# Patient Record
Sex: FEMALE | Race: WHITE | NOT HISPANIC OR LATINO | URBAN - METROPOLITAN AREA
[De-identification: names, ages, dates, MRNs, and addresses within clinical notes are randomized per-mention and may not be internally consistent; named-entity substitution may affect disease eponyms.]

---

## 2020-05-28 ENCOUNTER — APPOINTMENT (RX ONLY)
Dept: URBAN - METROPOLITAN AREA CLINIC 133 | Facility: CLINIC | Age: 45
Setting detail: DERMATOLOGY
End: 2020-05-28

## 2020-05-28 DIAGNOSIS — L71.8 OTHER ROSACEA: ICD-10-CM

## 2020-05-28 DIAGNOSIS — L20.89 OTHER ATOPIC DERMATITIS: ICD-10-CM

## 2020-05-28 DIAGNOSIS — L81.1 CHLOASMA: ICD-10-CM

## 2020-05-28 PROCEDURE — ? PRESCRIPTION

## 2020-05-28 PROCEDURE — ? COUNSELING

## 2020-05-28 PROCEDURE — ? ADDITIONAL NOTES

## 2020-05-28 PROCEDURE — 99202 OFFICE O/P NEW SF 15 MIN: CPT

## 2020-05-28 PROCEDURE — ? MEDICAL CONSULTATION: MELASMA

## 2020-05-28 RX ORDER — CRISABOROLE 20 MG/G
OINTMENT TOPICAL
Qty: 1 | Refills: 3 | Status: ERX | COMMUNITY
Start: 2020-05-28

## 2020-05-28 RX ORDER — METRONIDAZOLE 7.5 MG/G
CREAM TOPICAL
Qty: 1 | Refills: 3 | Status: ERX | COMMUNITY
Start: 2020-05-28

## 2020-05-28 RX ADMIN — METRONIDAZOLE: 7.5 CREAM TOPICAL at 00:00

## 2020-05-28 RX ADMIN — CRISABOROLE: 20 OINTMENT TOPICAL at 00:00

## 2020-05-28 ASSESSMENT — LOCATION SIMPLE DESCRIPTION DERM
LOCATION SIMPLE: RIGHT FOREHEAD
LOCATION SIMPLE: RIGHT CHEEK
LOCATION SIMPLE: NOSE

## 2020-05-28 ASSESSMENT — LOCATION DETAILED DESCRIPTION DERM
LOCATION DETAILED: RIGHT INFERIOR LATERAL MALAR CHEEK
LOCATION DETAILED: NASAL DORSUM
LOCATION DETAILED: RIGHT FOREHEAD

## 2020-05-28 ASSESSMENT — LOCATION ZONE DERM
LOCATION ZONE: FACE
LOCATION ZONE: NOSE

## 2024-05-15 NOTE — PROCEDURE: MEDICAL CONSULTATION: MELASMA
1 Detail Level: Zone No. WAYNE screening performed.  STOP BANG Legend: 0-2 = LOW Risk; 3-4 = INTERMEDIATE Risk; 5-8 = HIGH Risk